# Patient Record
Sex: MALE | Race: WHITE | NOT HISPANIC OR LATINO | Employment: FULL TIME | URBAN - METROPOLITAN AREA
[De-identification: names, ages, dates, MRNs, and addresses within clinical notes are randomized per-mention and may not be internally consistent; named-entity substitution may affect disease eponyms.]

---

## 2025-03-26 ENCOUNTER — HOSPITAL ENCOUNTER (OUTPATIENT)
Dept: RADIOLOGY | Facility: HOSPITAL | Age: 60
Discharge: HOME | End: 2025-03-26
Payer: COMMERCIAL

## 2025-03-26 DIAGNOSIS — Z13.6 ENCOUNTER FOR SCREENING FOR CARDIOVASCULAR DISORDERS: ICD-10-CM

## 2025-03-26 PROCEDURE — 75571 CT HRT W/O DYE W/CA TEST: CPT | Mod: LIO

## 2025-07-07 ENCOUNTER — APPOINTMENT (OUTPATIENT)
Dept: AUDIOLOGY | Facility: CLINIC | Age: 60
End: 2025-07-07

## 2025-07-07 DIAGNOSIS — H90.A21 SENSORINEURAL HEARING LOSS (SNHL) OF RIGHT EAR WITH RESTRICTED HEARING OF LEFT EAR: ICD-10-CM

## 2025-07-07 DIAGNOSIS — H90.A32 MIXED CONDUCTIVE AND SENSORINEURAL HEARING LOSS OF LEFT EAR WITH RESTRICTED HEARING OF RIGHT EAR: Primary | ICD-10-CM

## 2025-07-07 PROCEDURE — 92700 UNLISTED ORL SERVICE/PX: CPT | Mod: AUDSP

## 2025-07-07 ASSESSMENT — PAIN - FUNCTIONAL ASSESSMENT: PAIN_FUNCTIONAL_ASSESSMENT: 0-10

## 2025-07-07 ASSESSMENT — PAIN SCALES - GENERAL: PAINLEVEL_OUTOF10: 0 - NO PAIN

## 2025-07-21 NOTE — PROGRESS NOTES
AUDIOLOGY HEARING CONSULTATION:      Name:  Ty Rosa  :  1965  Age:  59 y.o.  Date of Encounter:  2025     Time: 7481-8512      HISTORY:  Mr. Rosa was seen today for a hearing aid consultation. A previous hearing evaluation on 25 completed at 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  Ty indicated he is interested in hearing aids.     Most important consideration for hearing aids: BiCROS      IMPRESSIONS:   The hearing test from 25 was reviewed with the patient. He is a BiCROS candidate. The benefits and drawbacks of different hearing aid styles and levels of technology were reviewed. Basic hearing aid use and parts were discussed. Ty decided to complete a BiCROS flex trial. He was fit with Phonak Audeo L90-R hearing aid on the right and Phonak CROS L-R on the left. Should he like to proceed with hearing aids he would like to pursue L90-R tech in the color P1 with length 1 /CROS tube.    He should consider following-up with an ENT for medical clearance of hearing aid use. This can be scheduled at [phone]. Alternatively, he will a have to sign a medical clearance waiver.     He has an insurance benefit through [facility] that will cover up to 80% of the hearing aids after meeting his deductible. They cover one per ear, max $1,000 per ear every 24 months.       RECOMMENDATIONS:  Strive for full-time device use during waking hours, except when activities preclude device safety.  Schedule an evaluation with an Ears Nose and Throat (ENT) provider to address asymmetric hearing and mixed hearing loss of the left ear. For ENT scheduling, call [phone].   3.   Return for flex-trial follow-up.       TESSA Sanchez, CCC-A   Clinical  Audiologist

## 2025-07-31 ENCOUNTER — APPOINTMENT (OUTPATIENT)
Dept: AUDIOLOGY | Facility: CLINIC | Age: 60
End: 2025-07-31